# Patient Record
(demographics unavailable — no encounter records)

---

## 2025-03-05 NOTE — DATA REVIEWED
[FreeTextEntry1] :  x-rays reviewed on the [facility] PACS system of his left wrist show no acute displaced fractures or bony abnormalities.

## 2025-03-05 NOTE — HISTORY OF PRESENT ILLNESS
[de-identified] :   Patient is a 54-year-old male here for evaluation of his left wrist.  He states that he works at [employer] and on 2/25/2025, he was at work when he started feeling pain in his left wrist.  He does a lot of repetitive motions including pushing, pulling, and heavy lifting.  He was working between 2 different departments that day.  On 2/28/2025, he went to [facility] where he had an x-ray and was told he has carpal tunnel syndrome.  However, he denies any numbness, tingling, or pain shooting into his first, second, third, or half of his fourth digits.  He is currently not working.  He feels better since resting and being out of work.

## 2025-03-05 NOTE — PHYSICAL EXAM
[de-identified] : Physical exam of the left wrist: Negative swelling or ecchymosis.  Nontender over the distal radius or distal ulna.  Negative anatomical snuffbox tenderness.  Negative Tinel, negative Phalen, negative median nerve compression.  No thenar atrophy.  He can make a full fist.  His  strength is 4 out of 5 on the left with the right.  His sensory are intact.

## 2025-03-05 NOTE — DISCUSSION/SUMMARY
[de-identified] : I explained to the patient that he is experiencing symptoms of tendinitis of the left wrist and not carpal tunnel syndrome.  Overall, he is feeling better.  Of his hand wear the cock-up wrist brace.  I prescribed him meloxicam 15 mg 1 tab once a day for the inflammation.  I gave him a prescription for therapy.  He will follow-up in about 4 to 6 weeks for repeat evaluation.  He would like to return to work on 3/12/2025 to his job at [employer] and 3/17/2025 to his job at [employer].  He is temporarily moderately disabled.  All questions were answered today.

## 2025-04-10 NOTE — HISTORY OF PRESENT ILLNESS
[de-identified] : Patient is a 54-year-old male here for repeat evaluation of his left wrist.  He is feeling much better.  He has been back to work full duty since 3/25/2025.

## 2025-04-10 NOTE — DISCUSSION/SUMMARY
[de-identified] : Patient has a resolved left wrist tendinitis.  He may continue to work full duty without restrictions at his 2 jobs.  He will follow-up in the office as needed.  All questions were answered today.

## 2025-04-10 NOTE — PHYSICAL EXAM
[de-identified] : Physical exam of his left wrist: Negative swelling or ecchymosis.  Nontender over the distal radius or distal ulna.  Negative anatomical snuffbox tenderness.  Full range of motion of the wrist and hand.   strength is 5 out of 5.  Sensory and motor are intact.